# Patient Record
Sex: MALE | Race: WHITE | NOT HISPANIC OR LATINO
[De-identification: names, ages, dates, MRNs, and addresses within clinical notes are randomized per-mention and may not be internally consistent; named-entity substitution may affect disease eponyms.]

---

## 2017-09-25 PROBLEM — Z00.00 ENCOUNTER FOR PREVENTIVE HEALTH EXAMINATION: Status: ACTIVE | Noted: 2017-09-25

## 2017-09-29 ENCOUNTER — APPOINTMENT (OUTPATIENT)
Dept: UROLOGY | Facility: CLINIC | Age: 67
End: 2017-09-29
Payer: MEDICARE

## 2017-09-29 VITALS
BODY MASS INDEX: 30.61 KG/M2 | TEMPERATURE: 97.9 F | WEIGHT: 195 LBS | HEIGHT: 67 IN | HEART RATE: 76 BPM | DIASTOLIC BLOOD PRESSURE: 84 MMHG | SYSTOLIC BLOOD PRESSURE: 149 MMHG

## 2017-09-29 DIAGNOSIS — E78.00 PURE HYPERCHOLESTEROLEMIA, UNSPECIFIED: ICD-10-CM

## 2017-09-29 DIAGNOSIS — I10 ESSENTIAL (PRIMARY) HYPERTENSION: ICD-10-CM

## 2017-09-29 DIAGNOSIS — M10.9 GOUT, UNSPECIFIED: ICD-10-CM

## 2017-09-29 DIAGNOSIS — Z80.0 FAMILY HISTORY OF MALIGNANT NEOPLASM OF DIGESTIVE ORGANS: ICD-10-CM

## 2017-09-29 PROCEDURE — 99203 OFFICE O/P NEW LOW 30 MIN: CPT

## 2017-10-03 RX ORDER — QUINAPRIL HYDROCHLORIDE 5 MG/1
TABLET, FILM COATED ORAL
Refills: 0 | Status: ACTIVE | COMMUNITY

## 2017-10-03 RX ORDER — AMLODIPINE BESYLATE 5 MG/1
TABLET ORAL
Refills: 0 | Status: ACTIVE | COMMUNITY

## 2017-10-03 RX ORDER — IBUPROFEN 200 MG/1
TABLET, COATED ORAL
Refills: 0 | Status: ACTIVE | COMMUNITY

## 2017-10-04 ENCOUNTER — OUTPATIENT (OUTPATIENT)
Dept: OUTPATIENT SERVICES | Facility: HOSPITAL | Age: 67
LOS: 1 days | End: 2017-10-04
Payer: MEDICARE

## 2017-10-04 VITALS
SYSTOLIC BLOOD PRESSURE: 138 MMHG | HEART RATE: 68 BPM | DIASTOLIC BLOOD PRESSURE: 98 MMHG | TEMPERATURE: 99 F | WEIGHT: 197.98 LBS | RESPIRATION RATE: 16 BRPM | HEIGHT: 67 IN

## 2017-10-04 DIAGNOSIS — C64.2 MALIGNANT NEOPLASM OF LEFT KIDNEY, EXCEPT RENAL PELVIS: ICD-10-CM

## 2017-10-04 DIAGNOSIS — I10 ESSENTIAL (PRIMARY) HYPERTENSION: ICD-10-CM

## 2017-10-04 DIAGNOSIS — K21.9 GASTRO-ESOPHAGEAL REFLUX DISEASE WITHOUT ESOPHAGITIS: ICD-10-CM

## 2017-10-04 DIAGNOSIS — Z90.89 ACQUIRED ABSENCE OF OTHER ORGANS: Chronic | ICD-10-CM

## 2017-10-04 LAB
APPEARANCE UR: CLEAR — SIGNIFICANT CHANGE UP
BILIRUB UR-MCNC: NEGATIVE — SIGNIFICANT CHANGE UP
BLD GP AB SCN SERPL QL: NEGATIVE — SIGNIFICANT CHANGE UP
BLOOD UR QL VISUAL: NEGATIVE — SIGNIFICANT CHANGE UP
BUN SERPL-MCNC: 30 MG/DL — HIGH (ref 7–23)
CALCIUM SERPL-MCNC: 10.8 MG/DL — HIGH (ref 8.4–10.5)
CHLORIDE SERPL-SCNC: 102 MMOL/L — SIGNIFICANT CHANGE UP (ref 98–107)
CO2 SERPL-SCNC: 21 MMOL/L — LOW (ref 22–31)
COLOR SPEC: SIGNIFICANT CHANGE UP
CREAT SERPL-MCNC: 1.27 MG/DL — SIGNIFICANT CHANGE UP (ref 0.5–1.3)
GLUCOSE SERPL-MCNC: 123 MG/DL — HIGH (ref 70–99)
GLUCOSE UR-MCNC: NEGATIVE — SIGNIFICANT CHANGE UP
HCT VFR BLD CALC: 39.1 % — SIGNIFICANT CHANGE UP (ref 39–50)
HGB BLD-MCNC: 12.9 G/DL — LOW (ref 13–17)
KETONES UR-MCNC: NEGATIVE — SIGNIFICANT CHANGE UP
LEUKOCYTE ESTERASE UR-ACNC: NEGATIVE — SIGNIFICANT CHANGE UP
MCHC RBC-ENTMCNC: 31.1 PG — SIGNIFICANT CHANGE UP (ref 27–34)
MCHC RBC-ENTMCNC: 33 % — SIGNIFICANT CHANGE UP (ref 32–36)
MCV RBC AUTO: 94.2 FL — SIGNIFICANT CHANGE UP (ref 80–100)
MUCOUS THREADS # UR AUTO: SIGNIFICANT CHANGE UP
NITRITE UR-MCNC: NEGATIVE — SIGNIFICANT CHANGE UP
NRBC # FLD: 0 — SIGNIFICANT CHANGE UP
PH UR: 6 — SIGNIFICANT CHANGE UP (ref 4.6–8)
PLATELET # BLD AUTO: 150 K/UL — SIGNIFICANT CHANGE UP (ref 150–400)
PMV BLD: 13 FL — SIGNIFICANT CHANGE UP (ref 7–13)
POTASSIUM SERPL-MCNC: 3.9 MMOL/L — SIGNIFICANT CHANGE UP (ref 3.5–5.3)
POTASSIUM SERPL-SCNC: 3.9 MMOL/L — SIGNIFICANT CHANGE UP (ref 3.5–5.3)
PROT UR-MCNC: NEGATIVE — SIGNIFICANT CHANGE UP
RBC # BLD: 4.15 M/UL — LOW (ref 4.2–5.8)
RBC # FLD: 13.2 % — SIGNIFICANT CHANGE UP (ref 10.3–14.5)
RH IG SCN BLD-IMP: POSITIVE — SIGNIFICANT CHANGE UP
SODIUM SERPL-SCNC: 138 MMOL/L — SIGNIFICANT CHANGE UP (ref 135–145)
SP GR SPEC: 1.01 — SIGNIFICANT CHANGE UP (ref 1–1.03)
SQUAMOUS # UR AUTO: SIGNIFICANT CHANGE UP
UROBILINOGEN FLD QL: NORMAL E.U. — SIGNIFICANT CHANGE UP (ref 0.1–0.2)
WBC # BLD: 6.86 K/UL — SIGNIFICANT CHANGE UP (ref 3.8–10.5)
WBC # FLD AUTO: 6.86 K/UL — SIGNIFICANT CHANGE UP (ref 3.8–10.5)

## 2017-10-04 PROCEDURE — 93010 ELECTROCARDIOGRAM REPORT: CPT

## 2017-10-04 RX ORDER — SODIUM CHLORIDE 9 MG/ML
1000 INJECTION, SOLUTION INTRAVENOUS
Qty: 0 | Refills: 0 | Status: DISCONTINUED | OUTPATIENT
Start: 2017-10-09 | End: 2017-10-09

## 2017-10-04 NOTE — H&P PST ADULT - NSANTHOSAYNRD_GEN_A_CORE
No. TORRES screening performed.  STOP BANG Legend: 0-2 = LOW Risk; 3-4 = INTERMEDIATE Risk; 5-8 = HIGH Risk

## 2017-10-04 NOTE — H&P PST ADULT - PMH
Essential hypertension    Gout    Hyperlipemia    Malignant neoplasm of kidney, left Essential hypertension    GERD (gastroesophageal reflux disease)    Gout    Hyperlipemia    Malignant neoplasm of kidney, left

## 2017-10-04 NOTE — H&P PST ADULT - VENOUS THROMBOEMBOLISM CURRENT STATUS
major surgery lasting over 3 hrs/major surgery, including arthroscopic and laparoscopic (greater than 1 hr)

## 2017-10-04 NOTE — H&P PST ADULT - GASTROINTESTINAL DETAILS
bowel sounds normal/no masses palpable/nontender/no rigidity/no organomegaly/soft/no rebound tenderness/no bruit/no guarding/no distention

## 2017-10-04 NOTE — H&P PST ADULT - FAMILY HISTORY
Father  Still living? No  Family history of colon cancer in father, Age at diagnosis: Age Unknown     Mother  Still living? No  Family history of malignant melanoma, Age at diagnosis: Age Unknown

## 2017-10-04 NOTE — H&P PST ADULT - LYMPHATIC
anterior cervical L/supraclavicular L/supraclavicular R/posterior cervical R/anterior cervical R/posterior cervical L

## 2017-10-04 NOTE — H&P PST ADULT - HISTORY OF PRESENT ILLNESS
66 yr old male with medical hx htn and hyperlipidemia presents for preop evaluation with c/o hematuria approx 5 weeks ago and lower back pain.  Pt was evaluated in ED s/p ct scan was dx with Malignant Neoplasm of left Kidney and is now Scheduled for Left Laparoscopic Radical Nephrectomy, Possible Open on 10/09/17.

## 2017-10-04 NOTE — H&P PST ADULT - RS GEN PE MLT RESP DETAILS PC
no rhonchi/no wheezes/breath sounds equal/respirations non-labored/no rales/clear to auscultation bilaterally

## 2017-10-04 NOTE — H&P PST ADULT - HEIGHT IN CM
Problem: Patient Care Overview (Adult)  Goal: Plan of Care Review  Outcome: Ongoing (interventions implemented as appropriate)  Patient independently ambulated into chemo infusion clinic today accompanied by spouse and mother/father in law. Patient skin warm and dry, RR even and unlabored. Patient in NAD and appears comfortable. Patient denies any pain or discomfort and tolerated Taxol treatment well. Pt continues to reports fatigue 3-4 days after chemo cycle. Pt swelling noted to L lower eyelid has completely resolved and pt denies any pain or discomfort to L lower eyelid. Patient follow up discussed and patient verbally expressed understanding.          170.18

## 2017-10-06 LAB
BACTERIA UR CULT: SIGNIFICANT CHANGE UP
SPECIMEN SOURCE: SIGNIFICANT CHANGE UP

## 2017-10-07 ENCOUNTER — MESSAGE (OUTPATIENT)
Age: 67
End: 2017-10-07

## 2017-10-09 ENCOUNTER — INPATIENT (INPATIENT)
Facility: HOSPITAL | Age: 67
LOS: 0 days | Discharge: HOME | End: 2017-10-10
Attending: UROLOGY | Admitting: UROLOGY
Payer: MEDICARE

## 2017-10-09 ENCOUNTER — APPOINTMENT (OUTPATIENT)
Dept: UROLOGY | Facility: HOSPITAL | Age: 67
End: 2017-10-09

## 2017-10-09 ENCOUNTER — TRANSCRIPTION ENCOUNTER (OUTPATIENT)
Age: 67
End: 2017-10-09

## 2017-10-09 ENCOUNTER — RESULT REVIEW (OUTPATIENT)
Age: 67
End: 2017-10-09

## 2017-10-09 VITALS
OXYGEN SATURATION: 96 % | HEIGHT: 67 IN | TEMPERATURE: 98 F | RESPIRATION RATE: 14 BRPM | DIASTOLIC BLOOD PRESSURE: 83 MMHG | WEIGHT: 197.98 LBS | SYSTOLIC BLOOD PRESSURE: 155 MMHG | HEART RATE: 81 BPM

## 2017-10-09 DIAGNOSIS — C64.2 MALIGNANT NEOPLASM OF LEFT KIDNEY, EXCEPT RENAL PELVIS: ICD-10-CM

## 2017-10-09 DIAGNOSIS — Z29.9 ENCOUNTER FOR PROPHYLACTIC MEASURES, UNSPECIFIED: ICD-10-CM

## 2017-10-09 DIAGNOSIS — E78.5 HYPERLIPIDEMIA, UNSPECIFIED: ICD-10-CM

## 2017-10-09 DIAGNOSIS — M10.9 GOUT, UNSPECIFIED: ICD-10-CM

## 2017-10-09 DIAGNOSIS — Z90.89 ACQUIRED ABSENCE OF OTHER ORGANS: Chronic | ICD-10-CM

## 2017-10-09 DIAGNOSIS — D64.9 ANEMIA, UNSPECIFIED: ICD-10-CM

## 2017-10-09 LAB — RH IG SCN BLD-IMP: POSITIVE — SIGNIFICANT CHANGE UP

## 2017-10-09 PROCEDURE — 88305 TISSUE EXAM BY PATHOLOGIST: CPT | Mod: 26

## 2017-10-09 PROCEDURE — 88307 TISSUE EXAM BY PATHOLOGIST: CPT | Mod: 26

## 2017-10-09 PROCEDURE — 76998 US GUIDE INTRAOP: CPT | Mod: 26

## 2017-10-09 PROCEDURE — 99223 1ST HOSP IP/OBS HIGH 75: CPT

## 2017-10-09 PROCEDURE — 50545 LAPARO RADICAL NEPHRECTOMY: CPT | Mod: LT

## 2017-10-09 RX ORDER — HYDROMORPHONE HYDROCHLORIDE 2 MG/ML
0.5 INJECTION INTRAMUSCULAR; INTRAVENOUS; SUBCUTANEOUS
Qty: 0 | Refills: 0 | Status: DISCONTINUED | OUTPATIENT
Start: 2017-10-09 | End: 2017-10-10

## 2017-10-09 RX ORDER — LANSOPRAZOLE 15 MG/1
1 CAPSULE, DELAYED RELEASE ORAL
Qty: 0 | Refills: 0 | COMMUNITY

## 2017-10-09 RX ORDER — LISINOPRIL 2.5 MG/1
20 TABLET ORAL
Qty: 0 | Refills: 0 | Status: DISCONTINUED | OUTPATIENT
Start: 2017-10-09 | End: 2017-10-09

## 2017-10-09 RX ORDER — SODIUM CHLORIDE 9 MG/ML
1000 INJECTION, SOLUTION INTRAVENOUS
Qty: 0 | Refills: 0 | Status: DISCONTINUED | OUTPATIENT
Start: 2017-10-09 | End: 2017-10-10

## 2017-10-09 RX ORDER — HEPARIN SODIUM 5000 [USP'U]/ML
5000 INJECTION INTRAVENOUS; SUBCUTANEOUS EVERY 8 HOURS
Qty: 0 | Refills: 0 | Status: DISCONTINUED | OUTPATIENT
Start: 2017-10-09 | End: 2017-10-10

## 2017-10-09 RX ORDER — AMLODIPINE BESYLATE 2.5 MG/1
1 TABLET ORAL
Qty: 0 | Refills: 0 | COMMUNITY

## 2017-10-09 RX ORDER — ATORVASTATIN CALCIUM 80 MG/1
1 TABLET, FILM COATED ORAL
Qty: 0 | Refills: 0 | COMMUNITY

## 2017-10-09 RX ORDER — SENNA PLUS 8.6 MG/1
2 TABLET ORAL AT BEDTIME
Qty: 0 | Refills: 0 | Status: DISCONTINUED | OUTPATIENT
Start: 2017-10-09 | End: 2017-10-10

## 2017-10-09 RX ORDER — AMLODIPINE BESYLATE 2.5 MG/1
5 TABLET ORAL DAILY
Qty: 0 | Refills: 0 | Status: DISCONTINUED | OUTPATIENT
Start: 2017-10-09 | End: 2017-10-10

## 2017-10-09 RX ORDER — OXYCODONE AND ACETAMINOPHEN 5; 325 MG/1; MG/1
2 TABLET ORAL EVERY 6 HOURS
Qty: 0 | Refills: 0 | Status: DISCONTINUED | OUTPATIENT
Start: 2017-10-09 | End: 2017-10-10

## 2017-10-09 RX ORDER — ATORVASTATIN CALCIUM 80 MG/1
10 TABLET, FILM COATED ORAL AT BEDTIME
Qty: 0 | Refills: 0 | Status: DISCONTINUED | OUTPATIENT
Start: 2017-10-09 | End: 2017-10-10

## 2017-10-09 RX ORDER — DOCUSATE SODIUM 100 MG
100 CAPSULE ORAL THREE TIMES A DAY
Qty: 0 | Refills: 0 | Status: DISCONTINUED | OUTPATIENT
Start: 2017-10-09 | End: 2017-10-10

## 2017-10-09 RX ORDER — ACETAMINOPHEN 500 MG
650 TABLET ORAL EVERY 6 HOURS
Qty: 0 | Refills: 0 | Status: DISCONTINUED | OUTPATIENT
Start: 2017-10-09 | End: 2017-10-10

## 2017-10-09 RX ORDER — PSYLLIUM SEED (WITH DEXTROSE)
1 POWDER (GRAM) ORAL
Qty: 0 | Refills: 0 | COMMUNITY

## 2017-10-09 RX ORDER — OXYCODONE AND ACETAMINOPHEN 5; 325 MG/1; MG/1
1 TABLET ORAL EVERY 4 HOURS
Qty: 0 | Refills: 0 | Status: DISCONTINUED | OUTPATIENT
Start: 2017-10-09 | End: 2017-10-09

## 2017-10-09 RX ORDER — METOPROLOL TARTRATE 50 MG
25 TABLET ORAL DAILY
Qty: 0 | Refills: 0 | Status: DISCONTINUED | OUTPATIENT
Start: 2017-10-09 | End: 2017-10-10

## 2017-10-09 RX ORDER — PANTOPRAZOLE SODIUM 20 MG/1
40 TABLET, DELAYED RELEASE ORAL
Qty: 0 | Refills: 0 | Status: DISCONTINUED | OUTPATIENT
Start: 2017-10-09 | End: 2017-10-10

## 2017-10-09 RX ORDER — HYDROMORPHONE HYDROCHLORIDE 2 MG/ML
1 INJECTION INTRAMUSCULAR; INTRAVENOUS; SUBCUTANEOUS EVERY 4 HOURS
Qty: 0 | Refills: 0 | Status: DISCONTINUED | OUTPATIENT
Start: 2017-10-09 | End: 2017-10-10

## 2017-10-09 RX ADMIN — HEPARIN SODIUM 5000 UNIT(S): 5000 INJECTION INTRAVENOUS; SUBCUTANEOUS at 22:38

## 2017-10-09 RX ADMIN — ATORVASTATIN CALCIUM 10 MILLIGRAM(S): 80 TABLET, FILM COATED ORAL at 22:38

## 2017-10-09 RX ADMIN — HEPARIN SODIUM 5000 UNIT(S): 5000 INJECTION INTRAVENOUS; SUBCUTANEOUS at 14:20

## 2017-10-09 RX ADMIN — Medication 100 MILLIGRAM(S): at 22:38

## 2017-10-09 RX ADMIN — SODIUM CHLORIDE 125 MILLILITER(S): 9 INJECTION, SOLUTION INTRAVENOUS at 12:41

## 2017-10-09 RX ADMIN — Medication 650 MILLIGRAM(S): at 23:24

## 2017-10-09 RX ADMIN — Medication 100 MILLIGRAM(S): at 14:20

## 2017-10-09 RX ADMIN — SENNA PLUS 2 TABLET(S): 8.6 TABLET ORAL at 22:38

## 2017-10-09 NOTE — CONSULT NOTE ADULT - PROBLEM SELECTOR RECOMMENDATION 9
-s/p left lap radical nephrectomy on 10/9  -postop management per , IVF LR, awaiting return of bowel function, pain control, bowel regimen, incentive spirometry and early mobilization  -monitor renal fxn and urine outpt postop

## 2017-10-09 NOTE — BRIEF OPERATIVE NOTE - PROCEDURE
<<-----Click on this checkbox to enter Procedure Radical nephrectomy with lymphadenectomy  10/09/2017    Active  PKHANDGE

## 2017-10-09 NOTE — CONSULT NOTE ADULT - SUBJECTIVE AND OBJECTIVE BOX
HPI:  66 yr old male with medical hx HTN, dyslipidemia, gout (diet controlled), admitted for left renal tumor, s/p left lap radical nephrectomy on 10/9. Pt c/o left knee pain and swelling c/w acute gout flare. Patient reports his uric acid is typically ~7, recently elevated to about 10. He denies excessive etoh use. He takes advil prn for gout attack. This morning, he got iv decadron for gout attack in his left knee, with much improvement. Medicine consult for medical comanagment.    Patient feels overall well, denies chest pain/sob. Denies any cardiac or pulmonary history. He reports left knee pain much better after iv steroid.       PAST MEDICAL & SURGICAL HISTORY:  GERD (gastroesophageal reflux disease)  Gout  Malignant neoplasm of kidney, left  Hyperlipemia  Essential hypertension  History of tonsillectomy      Review of Systems:   CONSTITUTIONAL: No fever, weight loss, or fatigue  EYES: No eye pain, visual disturbances, or discharge  ENMT:  No difficulty hearing, tinnitus, vertigo; No sinus or throat pain  NECK: No pain or stiffness  BREASTS: No pain, masses, or nipple discharge  RESPIRATORY: No cough, wheezing, chills or hemoptysis; No shortness of breath  CARDIOVASCULAR: No chest pain, palpitations, dizziness, or leg swelling  GASTROINTESTINAL: No abdominal or epigastric pain. No nausea, vomiting, or hematemesis; No diarrhea or constipation. No melena or hematochezia.  GENITOURINARY: No dysuria, frequency, hematuria, or incontinence  NEUROLOGICAL: No headaches, memory loss, loss of strength, numbness, or tremors  SKIN: No itching, burning, rashes, or lesions   LYMPH NODES: No enlarged glands  ENDOCRINE: No heat or cold intolerance; No hair loss  MUSCULOSKELETAL: left knee joint swelling and pain,  No muscle, back, or extremity pain  PSYCHIATRIC: No depression, anxiety, mood swings, or difficulty sleeping  HEME/LYMPH: No easy bruising, or bleeding gums  ALLERY AND IMMUNOLOGIC: No hives or eczema    Allergies    No Known Allergies    Intolerances        Social History:     FAMILY HISTORY:  Family history of malignant melanoma (Mother)  Family history of colon cancer in father (Father)      Home Medications:  Advil 200 mg oral tablet: 1 tab(s) orally every 6 hours, As Needed (09 Oct 2017 07:35)  Lipitor 10 mg oral tablet: 1 tab(s) orally once a day (09 Oct 2017 07:35)  Metamucil 3.4 g/7 g oral powder for reconstitution: 1 scoop(s) orally once a day (09 Oct 2017 07:35)  Norvasc 5 mg oral tablet: 1 tab(s) orally once a day (09 Oct 2017 07:35)  Prevacid 15 mg oral delayed release capsule: 1 cap(s) orally once a day (09 Oct 2017 07:35)  quinapril 20 mg oral tablet: 1 tab(s) orally 2 times a day- patient stopped 2 weeks ago (09 Oct 2017 07:35)      MEDICATIONS  (STANDING):  amLODIPine   Tablet 5 milliGRAM(s) Oral daily  atorvastatin 10 milliGRAM(s) Oral at bedtime  docusate sodium 100 milliGRAM(s) Oral three times a day  heparin  Injectable 5000 Unit(s) SubCutaneous every 8 hours  lactated ringers. 1000 milliLiter(s) (125 mL/Hr) IV Continuous <Continuous>  lisinopril 20 milliGRAM(s) Oral two times a day  pantoprazole    Tablet 40 milliGRAM(s) Oral before breakfast    MEDICATIONS  (PRN):  HYDROmorphone  Injectable 0.5 milliGRAM(s) IV Push every 10 minutes PRN Severe Pain (7 - 10)  HYDROmorphone  Injectable 1 milliGRAM(s) IV Push every 4 hours PRN Severe Pain  oxyCODONE    5 mG/acetaminophen 325 mG 1 Tablet(s) Oral every 4 hours PRN Mild Pain (1 - 3)  oxyCODONE    5 mG/acetaminophen 325 mG 2 Tablet(s) Oral every 6 hours PRN Moderate Pain (4 - 6)  senna 2 Tablet(s) Oral at bedtime PRN Constipation      Vital Signs Last 24 Hrs  T(C): 36.6 (09 Oct 2017 12:58), Max: 36.8 (09 Oct 2017 07:07)  T(F): 97.9 (09 Oct 2017 12:58), Max: 98.2 (09 Oct 2017 07:07)  HR: 71 (09 Oct 2017 12:58) (58 - 81)  BP: 144/74 (09 Oct 2017 12:58) (130/76 - 155/83)  BP(mean): --  RR: 18 (09 Oct 2017 12:58) (14 - 23)  SpO2: 99% (09 Oct 2017 12:58) (95% - 99%)  CAPILLARY BLOOD GLUCOSE        I&O's Summary    09 Oct 2017 07:01  -  09 Oct 2017 14:16  --------------------------------------------------------  IN: 370 mL / OUT: 1100 mL / NET: -730 mL        PHYSICAL EXAM:  GENERAL: NAD, well-developed  HEAD:  Atraumatic, Normocephalic  EYES: EOMI, PERRLA, conjunctiva and sclera clear  NECK: Supple, No JVD  CHEST/LUNG: Clear to auscultation bilaterally; No wheeze  HEART: Regular rate and rhythm; 3/6 systolic ejection murmur, rubs, or gallops  ABDOMEN: Soft, appropriately tender, incision site c/d/i  EXTREMITIES:  2+ Peripheral Pulses, No clubbing, cyanosis, or edema, left knee joint effusion/warmth, full rom  PSYCH: AAOx3  NEUROLOGY: non-focal  SKIN: No rashes or lesions    LABS: new labs pending        Microbiology     RADIOLOGY & ADDITIONAL TESTS:    Imaging Personally Reviewed:    Consultant(s) Notes Reviewed:      Care Discussed with Consultants/Other Providers: Urology AMAN Hernandez: iv solumedrol prn for gout flare-knee pain HPI:  66 yr old male with medical hx HTN, dyslipidemia, gout (diet controlled), admitted for left renal tumor, s/p left lap radical nephrectomy on 10/9. Pt c/o left knee pain and swelling c/w acute gout flare. Patient reports his uric acid is typically ~7, recently elevated to about 10. He denies excessive etoh use. He takes advil prn for gout attack. This morning, he got iv decadron for gout attack in his left knee, with much improvement. Medicine consult for medical comanagment.    Patient feels overall well, denies chest pain/sob. Denies any cardiac or pulmonary history. He reports left knee pain much better after iv steroid.       PAST MEDICAL & SURGICAL HISTORY:  GERD (gastroesophageal reflux disease)  Gout  Malignant neoplasm of kidney, left  Hyperlipemia  Essential hypertension  History of tonsillectomy      Review of Systems:   CONSTITUTIONAL: No fever, weight loss, or fatigue  EYES: No eye pain, visual disturbances, or discharge  ENMT:  No difficulty hearing, tinnitus, vertigo; No sinus or throat pain  NECK: No pain or stiffness  BREASTS: No pain, masses, or nipple discharge  RESPIRATORY: No cough, wheezing, chills or hemoptysis; No shortness of breath  CARDIOVASCULAR: No chest pain, palpitations, dizziness, or leg swelling  GASTROINTESTINAL: No abdominal or epigastric pain. No nausea, vomiting, or hematemesis; No diarrhea or constipation. No melena or hematochezia.  GENITOURINARY: No dysuria, frequency, hematuria, or incontinence  NEUROLOGICAL: No headaches, memory loss, loss of strength, numbness, or tremors  SKIN: No itching, burning, rashes, or lesions   LYMPH NODES: No enlarged glands  ENDOCRINE: No heat or cold intolerance; No hair loss  MUSCULOSKELETAL: left knee joint swelling and pain,  No muscle, back, or extremity pain  PSYCHIATRIC: No depression, anxiety, mood swings, or difficulty sleeping  HEME/LYMPH: No easy bruising, or bleeding gums  ALLERY AND IMMUNOLOGIC: No hives or eczema    Allergies    No Known Allergies    Intolerances        Social History: denies smoking, 1-2 drinks wine monthly    FAMILY HISTORY:  Family history of malignant melanoma (Mother)  Family history of colon cancer in father (Father)      Home Medications:  Advil 200 mg oral tablet: 1 tab(s) orally every 6 hours, As Needed (09 Oct 2017 07:35)  Lipitor 10 mg oral tablet: 1 tab(s) orally once a day (09 Oct 2017 07:35)  Metamucil 3.4 g/7 g oral powder for reconstitution: 1 scoop(s) orally once a day (09 Oct 2017 07:35)  Norvasc 5 mg oral tablet: 1 tab(s) orally once a day (09 Oct 2017 07:35)  Prevacid 15 mg oral delayed release capsule: 1 cap(s) orally once a day (09 Oct 2017 07:35)  quinapril 20 mg oral tablet: 1 tab(s) orally 2 times a day- patient stopped 2 weeks ago (09 Oct 2017 07:35)      MEDICATIONS  (STANDING):  amLODIPine   Tablet 5 milliGRAM(s) Oral daily  atorvastatin 10 milliGRAM(s) Oral at bedtime  docusate sodium 100 milliGRAM(s) Oral three times a day  heparin  Injectable 5000 Unit(s) SubCutaneous every 8 hours  lactated ringers. 1000 milliLiter(s) (125 mL/Hr) IV Continuous <Continuous>  lisinopril 20 milliGRAM(s) Oral two times a day  pantoprazole    Tablet 40 milliGRAM(s) Oral before breakfast    MEDICATIONS  (PRN):  HYDROmorphone  Injectable 0.5 milliGRAM(s) IV Push every 10 minutes PRN Severe Pain (7 - 10)  HYDROmorphone  Injectable 1 milliGRAM(s) IV Push every 4 hours PRN Severe Pain  oxyCODONE    5 mG/acetaminophen 325 mG 1 Tablet(s) Oral every 4 hours PRN Mild Pain (1 - 3)  oxyCODONE    5 mG/acetaminophen 325 mG 2 Tablet(s) Oral every 6 hours PRN Moderate Pain (4 - 6)  senna 2 Tablet(s) Oral at bedtime PRN Constipation      Vital Signs Last 24 Hrs  T(C): 36.6 (09 Oct 2017 12:58), Max: 36.8 (09 Oct 2017 07:07)  T(F): 97.9 (09 Oct 2017 12:58), Max: 98.2 (09 Oct 2017 07:07)  HR: 71 (09 Oct 2017 12:58) (58 - 81)  BP: 144/74 (09 Oct 2017 12:58) (130/76 - 155/83)  BP(mean): --  RR: 18 (09 Oct 2017 12:58) (14 - 23)  SpO2: 99% (09 Oct 2017 12:58) (95% - 99%)  CAPILLARY BLOOD GLUCOSE        I&O's Summary    09 Oct 2017 07:01  -  09 Oct 2017 14:16  --------------------------------------------------------  IN: 370 mL / OUT: 1100 mL / NET: -730 mL        PHYSICAL EXAM:  GENERAL: NAD, well-developed  HEAD:  Atraumatic, Normocephalic  EYES: EOMI, PERRLA, conjunctiva and sclera clear  NECK: Supple, No JVD  CHEST/LUNG: Clear to auscultation bilaterally; No wheeze  HEART: Regular rate and rhythm; 3/6 systolic ejection murmur, rubs, or gallops  ABDOMEN: Soft, appropriately tender, incision site c/d/i  EXTREMITIES:  2+ Peripheral Pulses, No clubbing, cyanosis, or edema, left knee joint effusion/warmth, full rom  PSYCH: AAOx3  NEUROLOGY: non-focal  SKIN: No rashes or lesions    LABS: new labs pending        Microbiology     RADIOLOGY & ADDITIONAL TESTS:    Imaging Personally Reviewed:    Consultant(s) Notes Reviewed:      Care Discussed with Consultants/Other Providers: Urology AMAN Hernandez: iv solumedrol prn for gout flare-knee pain

## 2017-10-09 NOTE — CONSULT NOTE ADULT - PROBLEM SELECTOR RECOMMENDATION 2
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   -suggest check uric acid level in am, iv solumedrol 40 mg prn only if he develops acute left knee gout flare again, case d/w MAXIM Hernandez

## 2017-10-09 NOTE — CONSULT NOTE ADULT - PROBLEM SELECTOR RECOMMENDATION 5
BP controlled, on lisinopril 20 mg bid and norvasc 5 mg  monitor renal fxn, if develops HARDEEP postop may have to switch ACEI to another antihypertensive agent

## 2017-10-09 NOTE — ASU PATIENT PROFILE, ADULT - PMH
Essential hypertension    GERD (gastroesophageal reflux disease)    Gout    Hyperlipemia    Malignant neoplasm of kidney, left

## 2017-10-09 NOTE — CONSULT NOTE ADULT - ASSESSMENT
66 yr old male with medical hx HTN, dyslipidemia, gout (diet controlled), admitted for left renal tumor, s/p left lap radical nephrectomy on 10/9, also has left knee acute gout flare

## 2017-10-10 ENCOUNTER — TRANSCRIPTION ENCOUNTER (OUTPATIENT)
Age: 67
End: 2017-10-10

## 2017-10-10 VITALS
DIASTOLIC BLOOD PRESSURE: 74 MMHG | SYSTOLIC BLOOD PRESSURE: 132 MMHG | HEART RATE: 73 BPM | OXYGEN SATURATION: 100 % | TEMPERATURE: 98 F | RESPIRATION RATE: 17 BRPM

## 2017-10-10 DIAGNOSIS — N17.9 ACUTE KIDNEY FAILURE, UNSPECIFIED: ICD-10-CM

## 2017-10-10 LAB
BASOPHILS # BLD AUTO: 0.01 K/UL — SIGNIFICANT CHANGE UP (ref 0–0.2)
BASOPHILS NFR BLD AUTO: 0.1 % — SIGNIFICANT CHANGE UP (ref 0–2)
BLD GP AB SCN SERPL QL: NEGATIVE — SIGNIFICANT CHANGE UP
BUN SERPL-MCNC: 25 MG/DL — HIGH (ref 7–23)
CALCIUM SERPL-MCNC: 10.4 MG/DL — SIGNIFICANT CHANGE UP (ref 8.4–10.5)
CHLORIDE SERPL-SCNC: 101 MMOL/L — SIGNIFICANT CHANGE UP (ref 98–107)
CO2 SERPL-SCNC: 24 MMOL/L — SIGNIFICANT CHANGE UP (ref 22–31)
CREAT SERPL-MCNC: 1.85 MG/DL — HIGH (ref 0.5–1.3)
EOSINOPHIL # BLD AUTO: 0 K/UL — SIGNIFICANT CHANGE UP (ref 0–0.5)
EOSINOPHIL NFR BLD AUTO: 0 % — SIGNIFICANT CHANGE UP (ref 0–6)
GLUCOSE SERPL-MCNC: 133 MG/DL — HIGH (ref 70–99)
HCT VFR BLD CALC: 36.7 % — LOW (ref 39–50)
HGB BLD-MCNC: 12.3 G/DL — LOW (ref 13–17)
IMM GRANULOCYTES # BLD AUTO: 0.08 # — SIGNIFICANT CHANGE UP
IMM GRANULOCYTES NFR BLD AUTO: 0.7 % — SIGNIFICANT CHANGE UP (ref 0–1.5)
LYMPHOCYTES # BLD AUTO: 1 K/UL — SIGNIFICANT CHANGE UP (ref 1–3.3)
LYMPHOCYTES # BLD AUTO: 8.5 % — LOW (ref 13–44)
MCHC RBC-ENTMCNC: 31.4 PG — SIGNIFICANT CHANGE UP (ref 27–34)
MCHC RBC-ENTMCNC: 33.5 % — SIGNIFICANT CHANGE UP (ref 32–36)
MCV RBC AUTO: 93.6 FL — SIGNIFICANT CHANGE UP (ref 80–100)
MONOCYTES # BLD AUTO: 0.68 K/UL — SIGNIFICANT CHANGE UP (ref 0–0.9)
MONOCYTES NFR BLD AUTO: 5.8 % — SIGNIFICANT CHANGE UP (ref 2–14)
NEUTROPHILS # BLD AUTO: 10.02 K/UL — HIGH (ref 1.8–7.4)
NEUTROPHILS NFR BLD AUTO: 84.9 % — HIGH (ref 43–77)
NRBC # FLD: 0 — SIGNIFICANT CHANGE UP
PLATELET # BLD AUTO: 161 K/UL — SIGNIFICANT CHANGE UP (ref 150–400)
PMV BLD: 12.9 FL — SIGNIFICANT CHANGE UP (ref 7–13)
POTASSIUM SERPL-MCNC: 4.6 MMOL/L — SIGNIFICANT CHANGE UP (ref 3.5–5.3)
POTASSIUM SERPL-SCNC: 4.6 MMOL/L — SIGNIFICANT CHANGE UP (ref 3.5–5.3)
RBC # BLD: 3.92 M/UL — LOW (ref 4.2–5.8)
RBC # FLD: 13.5 % — SIGNIFICANT CHANGE UP (ref 10.3–14.5)
RH IG SCN BLD-IMP: POSITIVE — SIGNIFICANT CHANGE UP
SODIUM SERPL-SCNC: 138 MMOL/L — SIGNIFICANT CHANGE UP (ref 135–145)
URATE SERPL-MCNC: 7.7 MG/DL — SIGNIFICANT CHANGE UP (ref 3.4–8.8)
WBC # BLD: 11.79 K/UL — HIGH (ref 3.8–10.5)
WBC # FLD AUTO: 11.79 K/UL — HIGH (ref 3.8–10.5)

## 2017-10-10 PROCEDURE — 99233 SBSQ HOSP IP/OBS HIGH 50: CPT

## 2017-10-10 RX ORDER — SODIUM CHLORIDE 9 MG/ML
1000 INJECTION, SOLUTION INTRAVENOUS
Qty: 0 | Refills: 0 | Status: DISCONTINUED | OUTPATIENT
Start: 2017-10-10 | End: 2017-10-10

## 2017-10-10 RX ORDER — IBUPROFEN 200 MG
1 TABLET ORAL
Qty: 0 | Refills: 0 | COMMUNITY

## 2017-10-10 RX ORDER — QUINAPRIL HYDROCHLORIDE 40 MG/1
1 TABLET, FILM COATED ORAL
Qty: 0 | Refills: 0 | COMMUNITY

## 2017-10-10 RX ADMIN — Medication 650 MILLIGRAM(S): at 00:18

## 2017-10-10 RX ADMIN — Medication 100 MILLIGRAM(S): at 14:38

## 2017-10-10 RX ADMIN — PANTOPRAZOLE SODIUM 40 MILLIGRAM(S): 20 TABLET, DELAYED RELEASE ORAL at 06:22

## 2017-10-10 RX ADMIN — Medication 25 MILLIGRAM(S): at 06:22

## 2017-10-10 RX ADMIN — Medication 10 MILLIGRAM(S): at 06:22

## 2017-10-10 RX ADMIN — Medication 650 MILLIGRAM(S): at 09:47

## 2017-10-10 RX ADMIN — HEPARIN SODIUM 5000 UNIT(S): 5000 INJECTION INTRAVENOUS; SUBCUTANEOUS at 14:38

## 2017-10-10 RX ADMIN — HEPARIN SODIUM 5000 UNIT(S): 5000 INJECTION INTRAVENOUS; SUBCUTANEOUS at 06:22

## 2017-10-10 RX ADMIN — Medication 100 MILLIGRAM(S): at 06:22

## 2017-10-10 RX ADMIN — Medication 650 MILLIGRAM(S): at 10:45

## 2017-10-10 NOTE — PROGRESS NOTE ADULT - SUBJECTIVE AND OBJECTIVE BOX
ANESTHESIA POSTOP CHECK    67y Male POSTOP DAY 1 S/P     Vital Signs Last 24 Hrs  T(C): 36.5 (10 Oct 2017 06:17), Max: 36.9 (10 Oct 2017 02:13)  T(F): 97.7 (10 Oct 2017 06:17), Max: 98.4 (10 Oct 2017 02:13)  HR: 77 (10 Oct 2017 06:17) (58 - 80)  BP: 143/82 (10 Oct 2017 06:17) (125/67 - 150/84)  BP(mean): --  RR: 17 (10 Oct 2017 06:17) (17 - 23)  SpO2: 98% (10 Oct 2017 06:17) (95% - 100%)  I&O's Summary    09 Oct 2017 07:01  -  10 Oct 2017 07:00  --------------------------------------------------------  IN: 370 mL / OUT: 3720 mL / NET: -3350 mL        [X ] NO APPARENT ANESTHESIA COMPLICATIONS      Comments:
Post op check    This 66 yo M is s/p L. lap rad neph    PMH: Gout; HTN; chol; GERD  Pt is awake and alert  Has no c/o    Afeb 144/74  71  99%RA  Abd- soft; appropriately tender             wounds C&D; lower midline oozy - covered with 4X4  Zavaleta 750 cc    L. knee this morning was tender and red; now much improved (as per pt) - hx gout; dexamethasone was given in the OR
Subjective    no acute events  small amount of flatus, no nausea or vomiting  pain controlled, tolerating clears  ambulating well    Objective    Vital signs  T(F): , Max: 98.4 HR: 77 BP: 143/82 SpO2: 98%     F:1050 cc     Gen: NAD  Abd: soft, appropriately tender, ND         incisions dry, intact  : urine clear, yellow; vasques removed for TOV
CHIEF COMPLAINT: Patient is a 67y old  male who presents with a chief complaint of they are going to do kidney surgery" (04 Oct 2017 09:43)      SUBJECTIVE / OVERNIGHT EVENTS:  Pt reports that he passed flatus, pain controlled, left knee pain improved, able to ambulate around the floor    MEDICATIONS  (STANDING):  amLODIPine   Tablet 5 milliGRAM(s) Oral daily  atorvastatin 10 milliGRAM(s) Oral at bedtime  dextrose 5% + sodium chloride 0.45%. 1000 milliLiter(s) (75 mL/Hr) IV Continuous <Continuous>  docusate sodium 100 milliGRAM(s) Oral three times a day  heparin  Injectable 5000 Unit(s) SubCutaneous every 8 hours  metoprolol succinate ER 25 milliGRAM(s) Oral daily  pantoprazole    Tablet 40 milliGRAM(s) Oral before breakfast    MEDICATIONS  (PRN):  acetaminophen   Tablet. 650 milliGRAM(s) Oral every 6 hours PRN Mild Pain (1 - 3)  HYDROmorphone  Injectable 0.5 milliGRAM(s) IV Push every 10 minutes PRN Severe Pain (7 - 10)  HYDROmorphone  Injectable 1 milliGRAM(s) IV Push every 4 hours PRN Severe Pain  oxyCODONE    5 mG/acetaminophen 325 mG 2 Tablet(s) Oral every 6 hours PRN Moderate Pain (4 - 6)  senna 2 Tablet(s) Oral at bedtime PRN Constipation      VITALS:  T(F): 98 (10-10-17 @ 09:52), Max: 98.4 (10-10-17 @ 02:13)  HR: 74 (10-10-17 @ 09:52) (60 - 80)  BP: 112/59 (10-10-17 @ 09:52) (112/59 - 144/74)  RR: 17 (10-10-17 @ 09:52) (17 - 20)  SpO2: 99% (10-10-17 @ 09:52)  Height (cm): 170.18 (12:58)  Weight (kg): 90.2 (12:58)  BMI (kg/m2): 31.1 (12:58)    CAPILLARY BLOOD GLUCOSE      PHYSICAL EXAM:  GENERAL: NAD, well-developed  HEAD:  Atraumatic, Normocephalic  EYES: EOMI, PERRLA, conjunctiva and sclera clear  NECK: Supple, No JVD  CHEST/LUNG: Clear to auscultation bilaterally; No wheeze  HEART: Regular rate and rhythm; No murmurs, rubs, or gallops  ABDOMEN: Soft, Nontender, Nondistended; Bowel sounds present, surgical site c/d/i  EXTREMITIES:  2+ Peripheral Pulses, No clubbing, cyanosis, or edema. left knee effusion/swelling/erythema improved  PSYCH: AAOx3  NEUROLOGY: non-focal  SKIN: No rashes or lesions    LABS:              12.3                 138  | 24   | 25           11.79 >-----------< 161     ------------------------< 133                   36.7                 4.6  | 101  | 1.85                                         Ca 10.4  Mg x     Ph x          Uric acid 7.7      MICROBIOLOGY:    RADIOLOGY & ADDITIONAL TESTS:    Imaging Personally Reviewed:    [ ] Consultant(s) Notes Reviewed:  [x ] Care Discussed with Consultants/Other Providers: MAXIM Hernandez, monitor off steroid unless left knee gout flares up again

## 2017-10-10 NOTE — PROGRESS NOTE ADULT - PROBLEM SELECTOR PROBLEM 1
Malignant neoplasm of kidney, left

## 2017-10-10 NOTE — DISCHARGE NOTE ADULT - MEDICATION SUMMARY - MEDICATIONS TO STOP TAKING
I will STOP taking the medications listed below when I get home from the hospital:    Advil 200 mg oral tablet  -- 1 tab(s) by mouth every 6 hours, As Needed

## 2017-10-10 NOTE — DISCHARGE NOTE ADULT - PLAN OF CARE
SHARATH nephrectomy as above; drink plenty of fluids; avoid constipation with stool softener/fiber; no treadmill; call office for fever over 101/nausea/vomiting

## 2017-10-10 NOTE — DISCHARGE NOTE ADULT - CARE PLAN
Principal Discharge DX:	Malignant neoplasm of kidney, left  Goal:	L. nephrectomy  Instructions for follow-up, activity and diet:	as above; drink plenty of fluids; avoid constipation with stool softener/fiber; no treadmill; call office for fever over 101/nausea/vomiting

## 2017-10-10 NOTE — PROGRESS NOTE ADULT - ASSESSMENT
68 yo M POD#1 s/p lap L radical nephrectomy
rakel s/p L. lap rad neph
66 yr old male with medical hx HTN, dyslipidemia, gout (diet controlled), admitted for left renal tumor, s/p left lap radical nephrectomy on 10/9, also has left knee acute gout flare

## 2017-10-10 NOTE — DISCHARGE NOTE ADULT - MEDICATION SUMMARY - MEDICATIONS TO TAKE
I will START or STAY ON the medications listed below when I get home from the hospital:    oxyCODONE-acetaminophen 5 mg-325 mg oral tablet  -- 1-2 tab(s) by mouth every 6 hours, As needed, Moderate Pain (4 - 6) MDD:8  -- Indication: For Pain    Lipitor 10 mg oral tablet  -- 1 tab(s) by mouth once a day  -- Indication: For Home med    Norvasc 5 mg oral tablet  -- 1 tab(s) by mouth once a day  -- Indication: For Home med    Metamucil 3.4 g/7 g oral powder for reconstitution  -- 1 scoop(s) by mouth once a day  -- Indication: For Home med    Prevacid 15 mg oral delayed release capsule  -- 1 cap(s) by mouth once a day  -- Indication: For Home med

## 2017-10-10 NOTE — DISCHARGE NOTE ADULT - PATIENT PORTAL LINK FT
“You can access the [facility] Patient Portal, offered by [facility], by registering with the following website: [email or web address]”

## 2017-10-10 NOTE — DISCHARGE NOTE ADULT - HOSPITAL COURSE
Pt had L. lap rad neph yesterday; did well; ambulating; voiding well with zero PVR; had flatus and 2 small BM's; andree reg diet; home today; f/u in office

## 2017-10-10 NOTE — DISCHARGE NOTE ADULT - CARE PROVIDER_API CALL
Berto Yu), Urology  [address] [place] [address]  Phone: [phone]  Fax: [phone]

## 2017-10-10 NOTE — DISCHARGE NOTE ADULT - NS AS ACTIVITY OBS
Walking-Outdoors allowed/Showering allowed/Stairs allowed/driving allowed Monday/No Heavy lifting/straining/Walking-Indoors allowed

## 2017-10-10 NOTE — DISCHARGE NOTE ADULT - CONDITIONS AT DISCHARGE
Pt. is afebrile and offers no complaints. In no acute distress. Abdominal steris: clean, dry and intact. Pt is ambulating , tolerating diet well, and voiding in adequate amounts.

## 2017-10-10 NOTE — DISCHARGE NOTE ADULT - INSTRUCTIONS
as tolerated Make a follow up appointment with Dr. Yu. Call MD if you develop a fever, or if there is redness, swelling, drainage or pain not relieved by pain medication. No heavy lifting, bending, or straining to move your bowels. Take over the counter stool softeners as needed to prevent constipation which may be caused by pain medication.

## 2017-10-10 NOTE — PROGRESS NOTE ADULT - PROBLEM SELECTOR PLAN 1
-CLD, MIVF  -f/u AM CBC, BMP, uric acid level  -monitor GI fnx, AM Dulcolax  -TOV, PVR  -OOB, IS  -appreciate hospitalist input
Check outputs  AM labs  GI function  Hospitalist to follow  OOB
-s/p left lap radical nephrectomy on 10/9  -postop management per , IVF D5 1/2 NS, awaiting return of bowel function, on clears, pain control, bowel regimen, incentive spirometry and early mobilization  -monitor renal fxn and urine outpt postop.

## 2017-10-10 NOTE — PROGRESS NOTE ADULT - PROBLEM SELECTOR PLAN 2
-likely acute gout attack due to surgery/stress  -uric acid level 7.7 mg/dl, no indication for allopurinol  -pt got 12 mg iv dexamethasone on the date of surgery with marked improvement  -can give iv solumedrol 40 mg prn or prednisone 30 mg only if he develops acute left knee gout flare again  -avoid NSAIDs given postop HARDEEP

## 2017-10-10 NOTE — PROGRESS NOTE ADULT - PROBLEM SELECTOR PLAN 3
-postop HARDEEP expected after left rad nephrectomy, likely hemodynamic mediated and due to loss of renal parenchyma  -IVF hydration, avoid nephrotoxins, ACEI (lisinopril) discontinued, avoid hypotension

## 2017-10-16 ENCOUNTER — MESSAGE (OUTPATIENT)
Age: 67
End: 2017-10-16

## 2017-10-19 LAB — SURGICAL PATHOLOGY STUDY: SIGNIFICANT CHANGE UP

## 2017-10-31 ENCOUNTER — APPOINTMENT (OUTPATIENT)
Dept: UROLOGY | Facility: CLINIC | Age: 67
End: 2017-10-31
Payer: MEDICARE

## 2017-10-31 PROCEDURE — 99024 POSTOP FOLLOW-UP VISIT: CPT

## 2017-11-28 ENCOUNTER — MESSAGE (OUTPATIENT)
Age: 67
End: 2017-11-28

## 2018-07-19 PROBLEM — E78.5 HYPERLIPIDEMIA, UNSPECIFIED: Chronic | Status: ACTIVE | Noted: 2017-10-04

## 2018-07-19 PROBLEM — M10.9 GOUT, UNSPECIFIED: Chronic | Status: ACTIVE | Noted: 2017-10-04

## 2018-07-19 PROBLEM — K21.9 GASTRO-ESOPHAGEAL REFLUX DISEASE WITHOUT ESOPHAGITIS: Chronic | Status: ACTIVE | Noted: 2017-10-04

## 2018-07-19 PROBLEM — I10 ESSENTIAL (PRIMARY) HYPERTENSION: Chronic | Status: ACTIVE | Noted: 2017-10-04

## 2018-07-19 PROBLEM — C64.2 MALIGNANT NEOPLASM OF LEFT KIDNEY, EXCEPT RENAL PELVIS: Chronic | Status: ACTIVE | Noted: 2017-10-04

## 2018-07-23 PROBLEM — Z80.0 FAMILY HISTORY OF COLON CANCER: Status: ACTIVE | Noted: 2017-09-29

## 2018-07-29 ENCOUNTER — FORM ENCOUNTER (OUTPATIENT)
Age: 68
End: 2018-07-29

## 2018-07-30 ENCOUNTER — OUTPATIENT (OUTPATIENT)
Dept: OUTPATIENT SERVICES | Facility: HOSPITAL | Age: 68
LOS: 1 days | End: 2018-07-30
Payer: MEDICARE

## 2018-07-30 ENCOUNTER — APPOINTMENT (OUTPATIENT)
Dept: ULTRASOUND IMAGING | Facility: IMAGING CENTER | Age: 68
End: 2018-07-30
Payer: MEDICARE

## 2018-07-30 DIAGNOSIS — Z90.89 ACQUIRED ABSENCE OF OTHER ORGANS: Chronic | ICD-10-CM

## 2018-07-30 DIAGNOSIS — C64.2 MALIGNANT NEOPLASM OF LEFT KIDNEY, EXCEPT RENAL PELVIS: ICD-10-CM

## 2018-07-30 PROCEDURE — 76775 US EXAM ABDO BACK WALL LIM: CPT | Mod: 26

## 2018-07-30 PROCEDURE — 76775 US EXAM ABDO BACK WALL LIM: CPT

## 2018-07-31 ENCOUNTER — APPOINTMENT (OUTPATIENT)
Dept: UROLOGY | Facility: CLINIC | Age: 68
End: 2018-07-31
Payer: MEDICARE

## 2018-07-31 DIAGNOSIS — C64.2 MALIGNANT NEOPLASM OF LEFT KIDNEY, EXCEPT RENAL PELVIS: ICD-10-CM

## 2018-07-31 PROCEDURE — 99214 OFFICE O/P EST MOD 30 MIN: CPT

## 2019-10-18 NOTE — ASU PATIENT PROFILE, ADULT - DOES PATIENT HAVE ADVANCE DIRECTIVE
Electrodesiccation And Curettage Text: The wound bed was treated with electrodesiccation and curettage after the biopsy was performed. No

## 2020-05-04 NOTE — CONSULT NOTE ADULT - CONSULT REASON
Therapy Progress Note


Pt declined OT services for this date stating he is hoping to discharge today. 

OT educated pt on importance of continuing UE exercises, he verbalizes 

understanding. OT will attempt again tomorrow if pt is still admitted.





1, visit


[id number]











JOVANI MASON OT           May 4, 2020 10:01
medical comanagement

## 2021-04-16 NOTE — ASU PREOP CHECKLIST - BLOOD AVAILABLE
DISPLAY PLAN FREE TEXT DISPLAY PLAN FREE TEXT DISPLAY PLAN FREE TEXT DISPLAY PLAN FREE TEXT DISPLAY PLAN FREE TEXT DISPLAY PLAN FREE TEXT ct sent/yes